# Patient Record
Sex: FEMALE | Race: WHITE | Employment: FULL TIME | URBAN - METROPOLITAN AREA
[De-identification: names, ages, dates, MRNs, and addresses within clinical notes are randomized per-mention and may not be internally consistent; named-entity substitution may affect disease eponyms.]

---

## 2017-01-09 DIAGNOSIS — Z12.31 ENCOUNTER FOR SCREENING MAMMOGRAM FOR BREAST CANCER: Primary | ICD-10-CM

## 2017-01-18 ENCOUNTER — OFFICE VISIT (OUTPATIENT)
Dept: OBGYN | Age: 47
End: 2017-01-18

## 2017-01-18 VITALS
DIASTOLIC BLOOD PRESSURE: 70 MMHG | HEIGHT: 62 IN | BODY MASS INDEX: 25.03 KG/M2 | SYSTOLIC BLOOD PRESSURE: 128 MMHG | WEIGHT: 136 LBS

## 2017-01-18 DIAGNOSIS — Z12.39 BREAST CANCER SCREENING: ICD-10-CM

## 2017-01-18 DIAGNOSIS — Z01.419 NORMAL GYNECOLOGIC EXAMINATION: Primary | ICD-10-CM

## 2017-01-18 PROCEDURE — 99386 PREV VISIT NEW AGE 40-64: CPT | Performed by: OBSTETRICS & GYNECOLOGY

## 2017-01-19 LAB — PAP SMEAR: ABNORMAL

## 2017-01-26 ENCOUNTER — TELEPHONE (OUTPATIENT)
Dept: OBGYN | Age: 47
End: 2017-01-26

## 2017-01-26 DIAGNOSIS — Z01.419 NORMAL GYNECOLOGIC EXAMINATION: ICD-10-CM

## 2017-02-17 ENCOUNTER — TELEPHONE (OUTPATIENT)
Dept: OBGYN | Age: 47
End: 2017-02-17

## 2017-03-30 ENCOUNTER — PROCEDURE VISIT (OUTPATIENT)
Dept: OBGYN | Age: 47
End: 2017-03-30

## 2017-03-30 VITALS
BODY MASS INDEX: 25.21 KG/M2 | SYSTOLIC BLOOD PRESSURE: 130 MMHG | HEART RATE: 76 BPM | HEIGHT: 62 IN | DIASTOLIC BLOOD PRESSURE: 82 MMHG | WEIGHT: 137 LBS

## 2017-03-30 DIAGNOSIS — B97.7 HPV IN FEMALE: Primary | ICD-10-CM

## 2017-03-30 LAB
CONTROL: NORMAL
PREGNANCY TEST URINE, POC: NORMAL

## 2017-03-30 PROCEDURE — 57454 BX/CURETT OF CERVIX W/SCOPE: CPT | Performed by: OBSTETRICS & GYNECOLOGY

## 2017-03-30 PROCEDURE — 81025 URINE PREGNANCY TEST: CPT | Performed by: OBSTETRICS & GYNECOLOGY

## 2017-03-30 RX ORDER — FLUCONAZOLE 200 MG/1
TABLET ORAL
Qty: 2 TABLET | Refills: 2 | Status: SHIPPED | OUTPATIENT
Start: 2017-03-30 | End: 2017-04-06

## 2017-04-10 DIAGNOSIS — B97.7 HPV IN FEMALE: ICD-10-CM

## 2017-04-12 RX ORDER — DROSPIRENONE, ETHINYL ESTRADIOL AND LEVOMEFOLATE CALCIUM AND LEVOMEFOLATE CALCIUM 3-0.02(24)
1 KIT ORAL DAILY
Qty: 28 TABLET | Refills: 2 | Status: ON HOLD | OUTPATIENT
Start: 2017-04-12 | End: 2017-05-23 | Stop reason: HOSPADM

## 2017-05-17 ENCOUNTER — PREP FOR PROCEDURE (OUTPATIENT)
Dept: OBGYN | Age: 47
End: 2017-05-17

## 2017-05-17 ENCOUNTER — OFFICE VISIT (OUTPATIENT)
Dept: OBGYN | Age: 47
End: 2017-05-17

## 2017-05-17 VITALS
DIASTOLIC BLOOD PRESSURE: 82 MMHG | HEIGHT: 62 IN | BODY MASS INDEX: 24.48 KG/M2 | SYSTOLIC BLOOD PRESSURE: 126 MMHG | WEIGHT: 133 LBS

## 2017-05-17 DIAGNOSIS — N92.1 MENOMETRORRHAGIA: ICD-10-CM

## 2017-05-17 DIAGNOSIS — N92.1 MENOMETRORRHAGIA: Primary | ICD-10-CM

## 2017-05-17 DIAGNOSIS — Z12.39 BREAST CANCER SCREENING: ICD-10-CM

## 2017-05-17 PROCEDURE — 99214 OFFICE O/P EST MOD 30 MIN: CPT | Performed by: OBSTETRICS & GYNECOLOGY

## 2017-05-17 PROCEDURE — 58100 BIOPSY OF UTERUS LINING: CPT | Performed by: OBSTETRICS & GYNECOLOGY

## 2017-05-17 RX ORDER — SODIUM CHLORIDE 0.9 % (FLUSH) 0.9 %
10 SYRINGE (ML) INJECTION EVERY 12 HOURS SCHEDULED
Status: CANCELLED | OUTPATIENT
Start: 2017-05-17

## 2017-05-17 RX ORDER — SODIUM CHLORIDE 0.9 % (FLUSH) 0.9 %
10 SYRINGE (ML) INJECTION PRN
Status: CANCELLED | OUTPATIENT
Start: 2017-05-17

## 2017-05-17 RX ORDER — SODIUM CHLORIDE, SODIUM LACTATE, POTASSIUM CHLORIDE, CALCIUM CHLORIDE 600; 310; 30; 20 MG/100ML; MG/100ML; MG/100ML; MG/100ML
INJECTION, SOLUTION INTRAVENOUS CONTINUOUS
Status: CANCELLED | OUTPATIENT
Start: 2017-05-17

## 2017-05-23 ENCOUNTER — ANESTHESIA EVENT (OUTPATIENT)
Dept: OPERATING ROOM | Age: 47
End: 2017-05-23
Payer: COMMERCIAL

## 2017-05-23 ENCOUNTER — HOSPITAL ENCOUNTER (OUTPATIENT)
Age: 47
Discharge: HOME | End: 2017-05-23
Attending: OBSTETRICS & GYNECOLOGY | Admitting: OBSTETRICS & GYNECOLOGY
Payer: COMMERCIAL

## 2017-05-23 ENCOUNTER — ANESTHESIA (OUTPATIENT)
Dept: OPERATING ROOM | Age: 47
End: 2017-05-23
Payer: COMMERCIAL

## 2017-05-23 VITALS — OXYGEN SATURATION: 100 % | DIASTOLIC BLOOD PRESSURE: 77 MMHG | TEMPERATURE: 96.8 F | SYSTOLIC BLOOD PRESSURE: 128 MMHG

## 2017-05-23 VITALS
OXYGEN SATURATION: 100 % | RESPIRATION RATE: 16 BRPM | DIASTOLIC BLOOD PRESSURE: 104 MMHG | BODY MASS INDEX: 23.92 KG/M2 | HEIGHT: 62 IN | WEIGHT: 130 LBS | HEART RATE: 71 BPM | TEMPERATURE: 97.8 F | SYSTOLIC BLOOD PRESSURE: 183 MMHG

## 2017-05-23 DIAGNOSIS — N92.1 MENOMETRORRHAGIA: ICD-10-CM

## 2017-05-23 LAB
ABO/RH: NORMAL
ANISOCYTOSIS: ABNORMAL
ANTIBODY SCREEN: NORMAL
BASOPHILS ABSOLUTE: 0.1 K/UL (ref 0–0.2)
BASOPHILS RELATIVE PERCENT: 1 %
CONTROL: NORMAL
EOSINOPHILS ABSOLUTE: 0.1 K/UL (ref 0–0.7)
EOSINOPHILS RELATIVE PERCENT: 2 %
HCT VFR BLD CALC: 40.2 % (ref 37–47)
HEMOGLOBIN: 13.4 G/DL (ref 12–16)
HYPOCHROMIA: 0
LYMPHOCYTES ABSOLUTE: 3 K/UL (ref 1–4.8)
LYMPHOCYTES RELATIVE PERCENT: 45 %
MACROCYTES: 0
MCH RBC QN AUTO: 27.2 PG (ref 27–31.3)
MCHC RBC AUTO-ENTMCNC: 33.4 % (ref 33–37)
MCV RBC AUTO: 81.3 FL (ref 82–100)
MICROCYTES: 0
MONOCYTES ABSOLUTE: 0.9 K/UL (ref 0.2–0.8)
MONOCYTES RELATIVE PERCENT: 12.5 %
NEUTROPHILS ABSOLUTE: 2.6 K/UL (ref 1.4–6.5)
NEUTROPHILS RELATIVE PERCENT: 39 %
PDW BLD-RTO: 17.3 % (ref 11.5–14.5)
PLATELET # BLD: 313 K/UL (ref 130–400)
PLATELET SLIDE REVIEW: ADEQUATE
POIKILOCYTES: 0
POLYCHROMASIA: 0
PREGNANCY TEST URINE, POC: NORMAL
RBC # BLD: 4.95 M/UL (ref 4.2–5.4)
SMUDGE CELLS: 2.9
WBC # BLD: 6.6 K/UL (ref 4.8–10.8)

## 2017-05-23 PROCEDURE — 86850 RBC ANTIBODY SCREEN: CPT

## 2017-05-23 PROCEDURE — 2580000003 HC RX 258: Performed by: NURSE ANESTHETIST, CERTIFIED REGISTERED

## 2017-05-23 PROCEDURE — 6360000002 HC RX W HCPCS: Performed by: NURSE ANESTHETIST, CERTIFIED REGISTERED

## 2017-05-23 PROCEDURE — 2500000003 HC RX 250 WO HCPCS

## 2017-05-23 PROCEDURE — 3600000014 HC SURGERY LEVEL 4 ADDTL 15MIN: Performed by: OBSTETRICS & GYNECOLOGY

## 2017-05-23 PROCEDURE — 2500000003 HC RX 250 WO HCPCS: Performed by: NURSE ANESTHETIST, CERTIFIED REGISTERED

## 2017-05-23 PROCEDURE — 7100000011 HC PHASE II RECOVERY - ADDTL 15 MIN: Performed by: OBSTETRICS & GYNECOLOGY

## 2017-05-23 PROCEDURE — 86900 BLOOD TYPING SEROLOGIC ABO: CPT

## 2017-05-23 PROCEDURE — 7100000000 HC PACU RECOVERY - FIRST 15 MIN: Performed by: OBSTETRICS & GYNECOLOGY

## 2017-05-23 PROCEDURE — 2580000003 HC RX 258: Performed by: OBSTETRICS & GYNECOLOGY

## 2017-05-23 PROCEDURE — 58563 HYSTEROSCOPY ABLATION: CPT | Performed by: OBSTETRICS & GYNECOLOGY

## 2017-05-23 PROCEDURE — 7100000001 HC PACU RECOVERY - ADDTL 15 MIN: Performed by: OBSTETRICS & GYNECOLOGY

## 2017-05-23 PROCEDURE — 85025 COMPLETE CBC W/AUTO DIFF WBC: CPT

## 2017-05-23 PROCEDURE — 2500000003 HC RX 250 WO HCPCS: Performed by: OBSTETRICS & GYNECOLOGY

## 2017-05-23 PROCEDURE — 3600000004 HC SURGERY LEVEL 4 BASE: Performed by: OBSTETRICS & GYNECOLOGY

## 2017-05-23 PROCEDURE — 88305 TISSUE EXAM BY PATHOLOGIST: CPT

## 2017-05-23 PROCEDURE — 2500000003 HC RX 250 WO HCPCS: Performed by: ANESTHESIOLOGY

## 2017-05-23 PROCEDURE — 7100000010 HC PHASE II RECOVERY - FIRST 15 MIN: Performed by: OBSTETRICS & GYNECOLOGY

## 2017-05-23 PROCEDURE — 3700000001 HC ADD 15 MINUTES (ANESTHESIA): Performed by: OBSTETRICS & GYNECOLOGY

## 2017-05-23 PROCEDURE — 86901 BLOOD TYPING SEROLOGIC RH(D): CPT

## 2017-05-23 PROCEDURE — 3700000000 HC ANESTHESIA ATTENDED CARE: Performed by: OBSTETRICS & GYNECOLOGY

## 2017-05-23 RX ORDER — DIPHENHYDRAMINE HYDROCHLORIDE 50 MG/ML
12.5 INJECTION INTRAMUSCULAR; INTRAVENOUS
Status: DISCONTINUED | OUTPATIENT
Start: 2017-05-23 | End: 2017-05-23 | Stop reason: HOSPADM

## 2017-05-23 RX ORDER — DEXAMETHASONE SODIUM PHOSPHATE 10 MG/ML
INJECTION INTRAMUSCULAR; INTRAVENOUS PRN
Status: DISCONTINUED | OUTPATIENT
Start: 2017-05-23 | End: 2017-05-23 | Stop reason: SDUPTHER

## 2017-05-23 RX ORDER — MEPERIDINE HYDROCHLORIDE 25 MG/ML
12.5 INJECTION INTRAMUSCULAR; INTRAVENOUS; SUBCUTANEOUS EVERY 5 MIN PRN
Status: DISCONTINUED | OUTPATIENT
Start: 2017-05-23 | End: 2017-05-23 | Stop reason: HOSPADM

## 2017-05-23 RX ORDER — SODIUM CHLORIDE, SODIUM LACTATE, POTASSIUM CHLORIDE, CALCIUM CHLORIDE 600; 310; 30; 20 MG/100ML; MG/100ML; MG/100ML; MG/100ML
INJECTION, SOLUTION INTRAVENOUS CONTINUOUS PRN
Status: DISCONTINUED | OUTPATIENT
Start: 2017-05-23 | End: 2017-05-23 | Stop reason: SDUPTHER

## 2017-05-23 RX ORDER — ONDANSETRON 2 MG/ML
4 INJECTION INTRAMUSCULAR; INTRAVENOUS
Status: DISCONTINUED | OUTPATIENT
Start: 2017-05-23 | End: 2017-05-23 | Stop reason: HOSPADM

## 2017-05-23 RX ORDER — LIDOCAINE HYDROCHLORIDE 10 MG/ML
1 INJECTION, SOLUTION EPIDURAL; INFILTRATION; INTRACAUDAL; PERINEURAL ONCE
Status: COMPLETED | OUTPATIENT
Start: 2017-05-23 | End: 2017-05-23

## 2017-05-23 RX ORDER — FENTANYL CITRATE 50 UG/ML
INJECTION, SOLUTION INTRAMUSCULAR; INTRAVENOUS PRN
Status: DISCONTINUED | OUTPATIENT
Start: 2017-05-23 | End: 2017-05-23 | Stop reason: SDUPTHER

## 2017-05-23 RX ORDER — OXYCODONE HYDROCHLORIDE AND ACETAMINOPHEN 5; 325 MG/1; MG/1
1 TABLET ORAL EVERY 4 HOURS PRN
Status: DISCONTINUED | OUTPATIENT
Start: 2017-05-23 | End: 2017-05-23 | Stop reason: HOSPADM

## 2017-05-23 RX ORDER — OXYCODONE HYDROCHLORIDE AND ACETAMINOPHEN 5; 325 MG/1; MG/1
2 TABLET ORAL EVERY 4 HOURS PRN
Status: DISCONTINUED | OUTPATIENT
Start: 2017-05-23 | End: 2017-05-23 | Stop reason: HOSPADM

## 2017-05-23 RX ORDER — KETOROLAC TROMETHAMINE 30 MG/ML
INJECTION, SOLUTION INTRAMUSCULAR; INTRAVENOUS PRN
Status: DISCONTINUED | OUTPATIENT
Start: 2017-05-23 | End: 2017-05-23 | Stop reason: SDUPTHER

## 2017-05-23 RX ORDER — ONDANSETRON 2 MG/ML
4 INJECTION INTRAMUSCULAR; INTRAVENOUS EVERY 8 HOURS PRN
Status: DISCONTINUED | OUTPATIENT
Start: 2017-05-23 | End: 2017-05-23 | Stop reason: HOSPADM

## 2017-05-23 RX ORDER — SODIUM CHLORIDE, SODIUM LACTATE, POTASSIUM CHLORIDE, CALCIUM CHLORIDE 600; 310; 30; 20 MG/100ML; MG/100ML; MG/100ML; MG/100ML
INJECTION, SOLUTION INTRAVENOUS
Status: DISCONTINUED
Start: 2017-05-23 | End: 2017-05-23 | Stop reason: HOSPADM

## 2017-05-23 RX ORDER — HYDROCODONE BITARTRATE AND ACETAMINOPHEN 5; 325 MG/1; MG/1
1 TABLET ORAL PRN
Status: DISCONTINUED | OUTPATIENT
Start: 2017-05-23 | End: 2017-05-23 | Stop reason: HOSPADM

## 2017-05-23 RX ORDER — GLYCOPYRROLATE 0.2 MG/ML
INJECTION INTRAMUSCULAR; INTRAVENOUS PRN
Status: DISCONTINUED | OUTPATIENT
Start: 2017-05-23 | End: 2017-05-23 | Stop reason: SDUPTHER

## 2017-05-23 RX ORDER — SODIUM CHLORIDE, SODIUM LACTATE, POTASSIUM CHLORIDE, CALCIUM CHLORIDE 600; 310; 30; 20 MG/100ML; MG/100ML; MG/100ML; MG/100ML
INJECTION, SOLUTION INTRAVENOUS CONTINUOUS
Status: DISCONTINUED | OUTPATIENT
Start: 2017-05-23 | End: 2017-05-23 | Stop reason: HOSPADM

## 2017-05-23 RX ORDER — METOCLOPRAMIDE HYDROCHLORIDE 5 MG/ML
10 INJECTION INTRAMUSCULAR; INTRAVENOUS
Status: DISCONTINUED | OUTPATIENT
Start: 2017-05-23 | End: 2017-05-23 | Stop reason: HOSPADM

## 2017-05-23 RX ORDER — HYDROCODONE BITARTRATE AND ACETAMINOPHEN 5; 325 MG/1; MG/1
2 TABLET ORAL PRN
Status: DISCONTINUED | OUTPATIENT
Start: 2017-05-23 | End: 2017-05-23 | Stop reason: HOSPADM

## 2017-05-23 RX ORDER — PROPOFOL 10 MG/ML
INJECTION, EMULSION INTRAVENOUS PRN
Status: DISCONTINUED | OUTPATIENT
Start: 2017-05-23 | End: 2017-05-23 | Stop reason: SDUPTHER

## 2017-05-23 RX ORDER — SODIUM CHLORIDE 0.9 % (FLUSH) 0.9 %
10 SYRINGE (ML) INJECTION EVERY 12 HOURS SCHEDULED
Status: DISCONTINUED | OUTPATIENT
Start: 2017-05-23 | End: 2017-05-23 | Stop reason: HOSPADM

## 2017-05-23 RX ORDER — OXYCODONE HYDROCHLORIDE AND ACETAMINOPHEN 5; 325 MG/1; MG/1
1 TABLET ORAL EVERY 4 HOURS PRN
Qty: 30 TABLET | Refills: 0 | Status: SHIPPED | OUTPATIENT
Start: 2017-05-23 | End: 2017-06-07

## 2017-05-23 RX ORDER — SODIUM CHLORIDE 0.9 % (FLUSH) 0.9 %
10 SYRINGE (ML) INJECTION PRN
Status: DISCONTINUED | OUTPATIENT
Start: 2017-05-23 | End: 2017-05-23 | Stop reason: HOSPADM

## 2017-05-23 RX ORDER — FENTANYL CITRATE 50 UG/ML
50 INJECTION, SOLUTION INTRAMUSCULAR; INTRAVENOUS EVERY 10 MIN PRN
Status: DISCONTINUED | OUTPATIENT
Start: 2017-05-23 | End: 2017-05-23 | Stop reason: HOSPADM

## 2017-05-23 RX ORDER — LABETALOL HYDROCHLORIDE 5 MG/ML
10 INJECTION, SOLUTION INTRAVENOUS ONCE
Status: DISCONTINUED | OUTPATIENT
Start: 2017-05-23 | End: 2017-05-23 | Stop reason: HOSPADM

## 2017-05-23 RX ORDER — LIDOCAINE HYDROCHLORIDE 20 MG/ML
INJECTION, SOLUTION INFILTRATION; PERINEURAL PRN
Status: DISCONTINUED | OUTPATIENT
Start: 2017-05-23 | End: 2017-05-23 | Stop reason: SDUPTHER

## 2017-05-23 RX ORDER — ONDANSETRON 2 MG/ML
INJECTION INTRAMUSCULAR; INTRAVENOUS PRN
Status: DISCONTINUED | OUTPATIENT
Start: 2017-05-23 | End: 2017-05-23 | Stop reason: SDUPTHER

## 2017-05-23 RX ORDER — IBUPROFEN 600 MG/1
600 TABLET ORAL EVERY 6 HOURS PRN
Qty: 120 TABLET | Refills: 3 | Status: SHIPPED | OUTPATIENT
Start: 2017-05-23 | End: 2017-06-07

## 2017-05-23 RX ORDER — LABETALOL HYDROCHLORIDE 5 MG/ML
INJECTION, SOLUTION INTRAVENOUS
Status: DISCONTINUED
Start: 2017-05-23 | End: 2017-05-23 | Stop reason: WASHOUT

## 2017-05-23 RX ORDER — MIDAZOLAM HYDROCHLORIDE 1 MG/ML
INJECTION INTRAMUSCULAR; INTRAVENOUS PRN
Status: DISCONTINUED | OUTPATIENT
Start: 2017-05-23 | End: 2017-05-23 | Stop reason: SDUPTHER

## 2017-05-23 RX ORDER — MAGNESIUM HYDROXIDE 1200 MG/15ML
LIQUID ORAL CONTINUOUS PRN
Status: DISCONTINUED | OUTPATIENT
Start: 2017-05-23 | End: 2017-05-23 | Stop reason: HOSPADM

## 2017-05-23 RX ADMIN — DOXYCYCLINE 100 G: 100 INJECTION, POWDER, LYOPHILIZED, FOR SOLUTION INTRAVENOUS at 08:23

## 2017-05-23 RX ADMIN — MIDAZOLAM HYDROCHLORIDE 2 MG: 1 INJECTION, SOLUTION INTRAMUSCULAR; INTRAVENOUS at 08:20

## 2017-05-23 RX ADMIN — DEXAMETHASONE SODIUM PHOSPHATE 6 MG: 10 INJECTION INTRAMUSCULAR; INTRAVENOUS at 08:24

## 2017-05-23 RX ADMIN — PROPOFOL 120 MG: 10 INJECTION, EMULSION INTRAVENOUS at 08:24

## 2017-05-23 RX ADMIN — LIDOCAINE HYDROCHLORIDE 1 ML: 10 INJECTION, SOLUTION EPIDURAL; INFILTRATION; INTRACAUDAL; PERINEURAL at 07:09

## 2017-05-23 RX ADMIN — ONDANSETRON 4 MG: 2 INJECTION, SOLUTION INTRAMUSCULAR; INTRAVENOUS at 08:47

## 2017-05-23 RX ADMIN — LABETALOL HYDROCHLORIDE 10 MG: 5 INJECTION, SOLUTION INTRAVENOUS at 09:38

## 2017-05-23 RX ADMIN — KETOROLAC TROMETHAMINE 15 MG: 30 INJECTION, SOLUTION INTRAMUSCULAR; INTRAVENOUS at 08:47

## 2017-05-23 RX ADMIN — GLYCOPYRROLATE 0.2 MG: 0.2 INJECTION INTRAMUSCULAR; INTRAVENOUS at 08:35

## 2017-05-23 RX ADMIN — LIDOCAINE HYDROCHLORIDE 70 MG: 20 INJECTION, SOLUTION INFILTRATION; PERINEURAL at 08:24

## 2017-05-23 RX ADMIN — SODIUM CHLORIDE, POTASSIUM CHLORIDE, SODIUM LACTATE AND CALCIUM CHLORIDE: 600; 310; 30; 20 INJECTION, SOLUTION INTRAVENOUS at 07:54

## 2017-05-23 RX ADMIN — FENTANYL CITRATE 50 MCG: 50 INJECTION, SOLUTION INTRAMUSCULAR; INTRAVENOUS at 08:24

## 2017-05-23 RX ADMIN — SODIUM CHLORIDE, POTASSIUM CHLORIDE, SODIUM LACTATE AND CALCIUM CHLORIDE 1000 ML: 600; 310; 30; 20 INJECTION, SOLUTION INTRAVENOUS at 07:09

## 2017-05-23 ASSESSMENT — PAIN - FUNCTIONAL ASSESSMENT: PAIN_FUNCTIONAL_ASSESSMENT: 0-10

## 2017-05-23 ASSESSMENT — PAIN SCALES - GENERAL: PAINLEVEL_OUTOF10: 0

## 2017-06-07 ENCOUNTER — OFFICE VISIT (OUTPATIENT)
Dept: OBGYN | Age: 47
End: 2017-06-07

## 2017-06-07 VITALS
HEIGHT: 62 IN | BODY MASS INDEX: 24.66 KG/M2 | WEIGHT: 134 LBS | SYSTOLIC BLOOD PRESSURE: 124 MMHG | DIASTOLIC BLOOD PRESSURE: 86 MMHG

## 2017-06-07 DIAGNOSIS — N92.1 MENOMETRORRHAGIA: Primary | ICD-10-CM

## 2017-06-07 PROCEDURE — 99213 OFFICE O/P EST LOW 20 MIN: CPT | Performed by: OBSTETRICS & GYNECOLOGY

## 2017-09-29 ENCOUNTER — TELEPHONE (OUTPATIENT)
Dept: OBGYN | Age: 47
End: 2017-09-29

## 2021-12-10 ENCOUNTER — HOSPITAL ENCOUNTER (OUTPATIENT)
Dept: WOMENS IMAGING | Age: 51
Discharge: HOME | End: 2021-12-12
Payer: COMMERCIAL

## 2021-12-10 VITALS — BODY MASS INDEX: 24.51 KG/M2 | HEIGHT: 62 IN

## 2021-12-10 DIAGNOSIS — Z12.31 SCREENING MAMMOGRAM, ENCOUNTER FOR: ICD-10-CM

## 2021-12-10 PROCEDURE — 77063 BREAST TOMOSYNTHESIS BI: CPT

## 2022-02-18 ENCOUNTER — HOSPITAL ENCOUNTER (OUTPATIENT)
Dept: PHYSICAL THERAPY | Age: 52
Setting detail: THERAPIES SERIES
Discharge: HOME | End: 2022-02-18
Payer: COMMERCIAL

## 2022-02-18 PROCEDURE — 97161 PT EVAL LOW COMPLEX 20 MIN: CPT

## 2022-02-18 ASSESSMENT — PAIN DESCRIPTION - PAIN TYPE: TYPE: CHRONIC PAIN

## 2022-02-18 ASSESSMENT — PAIN DESCRIPTION - LOCATION: LOCATION: KNEE

## 2022-02-18 ASSESSMENT — PAIN SCALES - GENERAL: PAINLEVEL_OUTOF10: 8

## 2022-02-18 ASSESSMENT — PAIN - FUNCTIONAL ASSESSMENT: PAIN_FUNCTIONAL_ASSESSMENT: PREVENTS OR INTERFERES WITH ALL ACTIVE AND SOME PASSIVE ACTIVITIES

## 2022-02-18 ASSESSMENT — PAIN DESCRIPTION - FREQUENCY: FREQUENCY: CONTINUOUS

## 2022-02-18 ASSESSMENT — PAIN DESCRIPTION - ORIENTATION: ORIENTATION: RIGHT;ANTERIOR

## 2022-02-18 ASSESSMENT — PAIN DESCRIPTION - ONSET: ONSET: ON-GOING

## 2022-02-18 ASSESSMENT — PAIN DESCRIPTION - DESCRIPTORS: DESCRIPTORS: TIGHTNESS

## 2022-02-18 ASSESSMENT — PAIN DESCRIPTION - PROGRESSION: CLINICAL_PROGRESSION: GRADUALLY WORSENING

## 2022-02-18 NOTE — PROGRESS NOTES
[address]  PHYSICAL THERAPY PLAN OF CARE   [address]enia [address], [address] [facility]         Ph: [phone]  Fax: [phone]    [] Certification  [] Recertification [x]  Plan of Care  [] Progress Note [] Discharge      To:   Beatriz Danielle      From:  Jeremi Kenny, PT, DPT   Patient: Dawit Virk     : 1970  Diagnosis: Patellofemoral pain syndrome of the R knee     Date: 2022  Treatment Diagnosis: R knee pain, decreased B tibial IR, R Hip ER, R HS flexibility, B gastroc flexibility, R soleus flexibility, decreased R LE strength, decreased R SLS stability, decreased functional squat, and impaired functional activity tolerance     Progress Report Period from:  2022  to 2022    Total # of Visits to Date: 1   No Show: 0    Canceled Appointment: 0     OBJECTIVE:   Short Term Goals - Time Frame for Short term goals: 2-3 weeks    Goals Current/Discharge status  Met   Short term goal 1: The pt will report decreased R knee pain >/=75% in order to increase ease with sleeping and sitting prolonged periods    Pain Location: Knee    Pain Level: 8 (Pain ranges from 3-10/10)    Pain Descriptors: Tightness [] yes  [x] no     Long Term Goals - Time Frame for Long term goals : 4-5 weeks  Goals Current/ Discharge status Met   Long term goal 1: The pt will have a increase in LEFS score >/=75/80 points in order to increase functional activity tolerance Exam: high; LEFS 63/80   [] yes  [x] no   Long term goal 2: The pt will demo improved R LE strength 4+ to 5/5 and R SL HR >15 reps without instability in order to ambulate prolonged periods without repports of R knee pain Strength RLE  Comment: 4/5 hip flex, IR, ER, knee flex, ext 5/5 ankle DF  Strength LLE  Comment: 5/5 knee, ankle DF, abd 4+/5 hip flex, ER, IR, ext     Strength Other  Other: instability noted with single leg HR R x12 reps; L HR WNL's [] yes  [x] no   Long term goal 3: The pt will demo improved R hip ER >/=40*, B tibial IR >/=20*, R 90/90 HS </=20*, B ankle DF with KE >/=15* in order to increase LE biomechanics PROM RLE (degrees)  RLE General PROM: 90/90 HS 26  AROM RLE (degrees)  RLE General AROM: Hip ER 30, IR 55; limited Tibial IR; ankle DF with KE 8, with KF 14; knee 0-135  AROM LLE (degrees)  LLE General AROM:  limited tibial IR [] yes  [x] no   Long term goal 4: The pt will be indep/compliant with HEP in order to self-manage symptoms upon D/C ongoing [] yes  [x] no   Long term goal 5: The pt will demo improved R SLS >/=10 seconds to increase R LE stability Balance  Single Leg Stance R Le  Single Leg Stance L Leg: 10  Comments: mild frontal plane instability L SLS, signifciant frontal plane instability and LOB SLS R [] yes  [x] no      Body structures, Functions, Activity limitations: Decreased functional mobility ,Decreased ROM,Decreased strength,Decreased posture,Increased pain,Decreased balance,Decreased high-level IADLs  Assessment: Pt presents with onset of R knee pain >/=6 weeks ago with progressive worsening with PMH of R calf strain >/=4 months ago. She currently presents with decreased B tibial IR, R Hip ER, R HS flexibility, B gastroc flexibility, R soleus flexibility, decreased LE biomechanics, decreased R LE strength, decreased R SLS stability, decreased functional squat, and impaired functional activity tolerance. These impairments currently limit her functional abilities to ambulate prolonged periods, sit prolonged periods, perofrm transfers after sitting prolonged periods, or sleep without increased pain or limitations at PLOF.   Prognosis: Excellent  Discharge Recommendations: Continue to assess pending progress    PT Education: Goals;PT Role;Plan of Care;Home Exercise Program  Patient Education: Insurance authorization    PLAN: [x] Evaluate and Treat  Frequency/Duration:  Plan  Times per week: 2  Plan weeks: 4-5  Current Treatment Recommendations: Strengthening,ROM,Balance Training,Manual Therapy - Soft Tissue Mobilization,Patient/Caregiver Education & Training,Neuromuscular Re-education,Home Exercise Program,Integrated Dry Needling,Positioning,Modalities,Manual Therapy - Joint Manipulation     Precautions: PMH CVA                          Patient Status:[x] Continue/ Initiate plan of Care    [] Discharge PT. Recommend pt continue with HEP. [] Additional visits requested, Please re-certify for additional visits:        Signature: Electronically signed by Jag Sanchez PT on 2/18/22 at 2:54 PM EST    If you have any questions or concerns, please don't hesitate to call. Thank you for your referral.    I have reviewed this plan of care and certify a need for medically necessary rehabilitation services.     Physician Signature:__________________________________________________________  Date:  Please sign and return

## 2022-02-18 NOTE — PROGRESS NOTES
[facility]  PHYSICAL THERAPY EVALUATION    Date: 2022  Patient Name: Benjie Kelsey       MRN: [id number]   Account: [de-identified]   : 1970  (54 y.o.)   Gender: female   Referring Practitioner: Fani Birmingham                 Diagnosis: Patellofemoral pain syndrome of the R knee  Treatment Diagnosis: R knee pain, decreased B tibial IR, R Hip ER, R HS flexibility, B gastroc flexibility, R soleus flexibility, decreased R LE strength, decreased R SLS stability, decreased functional squat, and impaired functional activity tolerance     Past Medical History:  has a past medical history of Goiter, HPV in female, and Hypertension. Past Surgical History:   has a past surgical history that includes cyst removal; Tubal ligation; and Dilation and curettage of uterus (N/A, 2017). Vital Signs  Patient Currently in Pain: Yes   Pain Screening  Patient Currently in Pain: Yes  Pain Assessment  Pain Assessment: 0-10  Pain Level: 8 (Pain ranges from 3-10/10)  Pain Type: Chronic pain  Pain Location: Knee  Pain Orientation: Right; Anterior  Pain Descriptors: Tightness  Pain Frequency: Continuous  Pain Onset: On-going  Clinical Progression: Gradually worsening  Functional Pain Assessment: Prevents or interferes with all active and some passive activities  Non-Pharmaceutical Pain Intervention(s): Heat applied (sits in hot tub)     Lives With: Alone  Type of Home: House  Home Layout: One level  ADL Assistance: Independent  Homemaking Assistance: Independent  Homemaking Responsibilities: Yes  Ambulation Assistance: Independent  Transfer Assistance: Independent  Active : Yes  Occupation: Full time employment  Type of occupation: - sits prolonged periods with icnreased pain and stiffness  Leisure & Hobbies: Exercise  IADL Comments: current fucntional level 75%; PLOF 100%  Additional Comments: 7-8 stairs to enter work building- able to perform reciprocally Subjective:  Subjective: Pt reports last 2021 stepped in a hold in the yard and felt a \"pop\" in R calf region, went to the ER and had therapy with relief. Was okay until a few months ago tripped over dog in 2021 and felt the same area re-injure, now pain has began to travel to the knee over the last 6 weeks. Pt denies wearing any current inserts in shoes d/t ill fitting in winter boots. Had xrays taken which showed some arthritis. Pt was prescribed anti-inflammatory though has not been taking d/t making her ill. Denies any N/T into L LE, falls, popping, clicking, or buckling.   Comments: RTD 3/15/22    Objective:   Balance  Single Leg Stance R Le  Single Leg Stance L Leg: 10  Comments: mild frontal plane instability L SLS, signifciant frontal plane instability and LOB SLS R    Strength RLE  Comment: 4/5 hip flex, IR, ER, knee flex, ext 5/5 ankle DF  Strength LLE  Comment: 5/5 knee, ankle DF, abd 4+/5 hip flex, ER, IR, ext     Strength Other  Other: instability noted with single leg HR R x12 reps; L HR WNL's    PROM RLE (degrees)  RLE General PROM: 90/90 HS 26  AROM RLE (degrees)  RLE General AROM: Hip ER 30, IR 55; limited Tibial IR; ankle DF with KE 8, with KF 14; knee 0-135  PROM LLE (degrees)  LLE General PROM: 90/90 HS 10  AROM LLE (degrees)  LLE General AROM: Hip ER 40, IR 55; limited tibial IR; ankle DF with KE 10, with KF 20; knee 0-135    Observation/Palpation  Posture: Good  Palpation: (-) tenderness to palpate med/lateral joint line, knee structures; mild tightness distal R ITB  Observation: R genuvlagus, B calcaneal varus, patella loreto    Additional Measures  Flexibility: decreased R HS flexibility, decreased B gastroc flexibility, R soleus flexibility  Special Tests: (-) Thessaleys, McMurrays, varus/valgus stress, Anterior/posterior drawer, Apleys compression  Other: able to perform mid depth squat with pain     Exercises:   Exercises  Exercise 1: gastroc stretch with strap 30\"x1- demo for HEP  Exercise 2: HS stretch long sitting 30\"x1- demo for HEP  Exercise 3: 4 way SLR*  Exercise 4: S/L hip series*  Exercise 5: bridges*  Exercise 6: 90/90 pball press/hip ext into wall*  Exercise 7: Seated Tibial IR Yifan*  Exercise 8: R piriformis stretch*  Exercise 9: SLS*  Exercise 10: step ups with focus on stability*  Exercise 11: 4 way hip*  Exercise 20: HEP: gastroc/HS stretches  *Indicates exercise,modality, or manual techniques to be initiated when appropriate    Assessment: Body structures, Functions, Activity limitations: Decreased functional mobility ,Decreased ROM,Decreased strength,Decreased posture,Increased pain,Decreased balance,Decreased high-level IADLs  Assessment: Pt presents with onset of R knee pain >/=6 weeks ago with progressive worsening with PMH of R calf strain >/=4 months ago. She currently presents with decreased B tibial IR, R Hip ER, R HS flexibility, B gastroc flexibility, R soleus flexibility, decreased LE biomechanics, decreased R LE strength, decreased R SLS stability, decreased functional squat, and impaired functional activity tolerance. These impairments currently limit her functional abilities to ambulate prolonged periods, sit prolonged periods, perofrm transfers after sitting prolonged periods, or sleep without increased pain or limitations at PLOF. Prognosis: Excellent  Discharge Recommendations: Continue to assess pending progress  Activity Tolerance: Patient Tolerated treatment well  Activity Tolerance:  Tolerated eval without incidence     Decision Making: Low Complexity  History: med: chronic pain, CVA  Exam: high; LEFS 63/80  Clinical Presentation: low      Plan  Frequency/Duration:  Plan  Times per week: 2  Plan weeks: 4-5  Current Treatment Recommendations: Strengthening,ROM,Balance Training,Manual Therapy - Soft Tissue Mobilization,Patient/Caregiver Education & Training,Neuromuscular Re-education,Home Exercise Program,Integrated Dry Needling,Positioning,Modalities,Manual Therapy - Joint Manipulation    Patient Education  New Education Provided: PT Education: Goals;PT Role;Plan of Care;Home Exercise Program  Patient Education: Insurance authorization    POST-PAIN     Pain Rating (0-10 pain scale):  3 /10  Location and pain description same as pre-treatment unless indicated. Action: [] NA  [] Call Physician  [x] Perform HEP  [] Meds as prescribed    Evaluation and patient rights have been reviewed and patient agrees with plan of care. Yes  [x]  No  []   Explain:     Kyrie Fall Risk Assessment  Risk Factor Scale  Score   History of Falls [] Yes  [x] No 25  0    Secondary Diagnosis [] Yes  [x] No 15  0    Ambulatory Aid [] Furniture  [] Crutches/cane/walker  [x] None/bedrest/wheelchair/nurse 30  15  0    IV/Heparin Lock [] Yes  [x] No 20  0    Gait/Transferring [] Impaired  [] Weak  [x] Normal/bedrest/immobile 20  10  0    Mental Status [] Forgets limitations  [x] Oriented to own ability 15  0       Total: 0     Based on the Assessment score: check the appropriate box.   [x]  No intervention needed   Low =   Score of 0-24  []  Use standard prevention interventions Moderate =  Score of 24-44   [] Discuss fall prevention strategies   [] Indicate moderate falls risk on eval  []  Use high risk prevention interventions High = Score of 45 and higher   [] Discuss fall prevention strategies   [] Provide supervision during treatment time    Goals  Short term goals  Time Frame for Short term goals: 2-3 weeks  Short term goal 1: The pt will report decreased R knee pain >/=75% in order to increase ease with sleeping and sitting prolonged periods  Long term goals  Time Frame for Long term goals : 4-5 weeks  Long term goal 1: The pt will have a increase in LEFS score >/=75/80 points in order to increase functional activity tolerance  Long term goal 2: The pt will demo improved R LE strength 4+ to 5/5 and R SL HR >15 reps without instability in order to ambulate prolonged periods without repports of R knee pain  Long term goal 3: The pt will demo improved R hip ER >/=40*, B tibial IR >/=20*, R 90/90 HS </=20*, B ankle DF with KE >/=15* in order to increase LE biomechanics  Long term goal 4: The pt will be indep/compliant with HEP in order to self-manage symptoms upon D/C  Long term goal 5:  The pt will demo improved R SLS >/=10 seconds to increase R LE stability    Treatment Initiated : HEP    PT Individual Minutes  Time In: 1350  Time Out: 1425  Minutes: 35  Timed Code Treatment Minutes: 2 Minutes  Procedure Minutes: 33 eval     Timed Activity Minutes Units   Ther Ex 2 0     Electronically signed by Antonia Pickard PT on 2/18/22 at 2:56 PM EST

## 2022-02-25 ENCOUNTER — HOSPITAL ENCOUNTER (OUTPATIENT)
Dept: PHYSICAL THERAPY | Age: 52
Setting detail: THERAPIES SERIES
Discharge: HOME | End: 2022-02-25
Payer: COMMERCIAL

## 2022-02-25 NOTE — PROGRESS NOTES
Therapy                            Cancellation/No-show Note      Date:  2022  Patient Name:  Benjie Kelsey  :  1970   MRN:  [id number]  Referring Practitioner: Fani Birmingham  Diagnosis: Patellofemoral pain syndrome of the R knee    Visit Information:  PT Visit Information  PT Insurance Information: BCBS  Total # of Visits to Date: 1  No Show: 0  Canceled Appointment: 1  Progress Note Counter: CX 22    For today's appointment patient:  [x]  Cancelled  []  Rescheduled appointment  []  No-show   []  Called pt to remind of next appointment     Reason given by patient:  []  Patient ill  []  Conflicting appointment  []  No transportation    [x]  Conflict with Babysitting   []  No reason given  []  Other:      [x] Pt has future appointments scheduled, no follow up needed  [] Pt requests to be on hold.     Reason:   If > 2 weeks please discuss with therapist.  [] Therapist to call pt for follow up     Comments:       Signature: Electronically signed by Rod Brandon PTA on 22 at 10:00 AM EST

## 2022-03-01 ENCOUNTER — HOSPITAL ENCOUNTER (OUTPATIENT)
Dept: PHYSICAL THERAPY | Age: 52
Setting detail: THERAPIES SERIES
Discharge: HOME | End: 2022-03-01
Payer: COMMERCIAL

## 2022-03-01 PROCEDURE — 97110 THERAPEUTIC EXERCISES: CPT

## 2022-03-01 ASSESSMENT — PAIN DESCRIPTION - PAIN TYPE: TYPE: CHRONIC PAIN

## 2022-03-01 ASSESSMENT — PAIN DESCRIPTION - LOCATION: LOCATION: KNEE

## 2022-03-01 ASSESSMENT — PAIN SCALES - GENERAL: PAINLEVEL_OUTOF10: 2

## 2022-03-01 ASSESSMENT — PAIN DESCRIPTION - ORIENTATION: ORIENTATION: RIGHT;ANTERIOR

## 2022-03-01 ASSESSMENT — PAIN DESCRIPTION - DESCRIPTORS: DESCRIPTORS: ACHING

## 2022-03-01 NOTE — PROGRESS NOTES
[address] [facility]  Outpatient Physical Therapy    Treatment Note        Date: 3/1/2022  Patient: Anette Damon  : 1970  ACCT #: [de-identified]  Referring Practitioner: Hollis Guillermo  Diagnosis: Patellofemoral pain syndrome of the R knee  Treatment Diagnosis: R knee pain, decreased B tibial IR, R Hip ER, R HS flexibility, B gastroc flexibility, R soleus flexibility, decreased R LE strength, decreased R SLS stability, decreased functional squat, and impaired functional activity tolerance     Visit Information:  PT Visit Information  PT Insurance Information: BCBS  Total # of Visits Approved: 5  Total # of Visits to Date: 1  No Show: 0  Progress Note Due Date: 22  Canceled Appointment: 1  Progress Note Counter:  (through 22)    Subjective: Pt reports she has had minimal pain lately, has been compliant with HEP and has also been tanning and feels that the heat from the tanning bed might be helping with her pain. HEP Compliance:  [x] Good [] Fair [] Poor [] Reports not doing due to:    Vital Signs  Patient Currently in Pain: Yes   Pain Screening  Patient Currently in Pain: Yes  Pain Assessment  Pain Assessment: 0-10  Pain Level: 2  Pain Type: Chronic pain  Pain Location: Knee  Pain Orientation: Right; Anterior  Pain Descriptors: Aching    OBJECTIVE:   Exercises  Exercise 1: gastroc stretch with strap 30\"x3  Exercise 2: HS stretch long sitting and standing at tower 30\"x3 ea. Exercise 3: 4 way SLR x15 ea.   Exercise 4: S/L hip series x10 circles  Exercise 5: bridges 5\"x15  Exercise 6: 90/90 single leg pball press/hip ext into wall 5\"x10  Exercise 7: Seated Tibial IR Yifan 2x15- VC/TC's for decreased hip compensations  Exercise 8: R piriformis stretch 30\"x3  Exercise 9: SLS 10\"x5- D/C NV  Exercise 10: step ups 6\" with focus on stability/opposite LE march and hold 5\"x15 F/L  Exercise 11: 4 way hip RTB x10 Yifan  Exercise 12: R SLS w reach x10  Exercise 13: eccentric HR (up with Yifan down on R only) 5\"x10  Exercise 14: Fitter B gastroc/soleus stretch 30\"x3 (2nd position)  Exercise 15: glute dominant squats x15  Exercise 20: HEP: 4 way SLR, bridges, pirifromis stretch    Strength: [x] NT  [] MMT completed:    ROM: [x] NT  [] ROM measurements:    *Indicates exercise, modality, or manual techniques to be initiated when appropriate    Assessment: Body structures, Functions, Activity limitations: Decreased functional mobility ,Decreased ROM,Decreased strength,Decreased posture,Increased pain,Decreased balance,Decreased high-level IADLs  Assessment: Initiated multiple exercises for increased functional R LE strength and stability as well as stretches to improve LE biomechanics. Pt with good tolerance to tx without reports of pain though some increased fatigue noted throughout. Pt also challenged with dynamic single leg stability activities though met static SLS goal this date.   Treatment Diagnosis: R knee pain, decreased B tibial IR, R Hip ER, R HS flexibility, B gastroc flexibility, R soleus flexibility, decreased R LE strength, decreased R SLS stability, decreased functional squat, and impaired functional activity tolerance  Prognosis: Excellent     Goals:  Short term goals  Time Frame for Short term goals: 2-3 weeks  Short term goal 1: The pt will report decreased R knee pain >/=75% in order to increase ease with sleeping and sitting prolonged periods    Long term goals  Time Frame for Long term goals : 4-5 weeks  Long term goal 1: The pt will have a increase in LEFS score >/=75/80 points in order to increase functional activity tolerance  Long term goal 2: The pt will demo improved R LE strength 4+ to 5/5 and R SL HR >15 reps without instability in order to ambulate prolonged periods without repports of R knee pain  Long term goal 3: The pt will demo improved R hip ER >/=40*, B tibial IR >/=20*, R 90/90 HS </=20*, B ankle DF with KE >/=15* in order to increase LE biomechanics  Long term goal 4: The pt will be indep/compliant with HEP in order to self-manage symptoms upon D/C  Long term goal 5: The pt will demo improved R SLS >/=10 seconds to increase R LE stability (met 3/1/22)  Progress toward goals: to be determined, met LTG 5     POST-PAIN       Pain Rating (0-10 pain scale):  0 /10   Location and pain description same as pre-treatment unless indicated. Action: [] NA   [x] Perform HEP  [] Meds as prescribed  [] Modalities as prescribed   [] Call Physician     Frequency/Duration:  Plan  Times per week: 2  Plan weeks: 4-5  Current Treatment Recommendations: Strengthening,ROM,Balance Training,Manual Therapy - Soft Tissue Mobilization,Patient/Caregiver Education & Training,Neuromuscular Re-education,Home Exercise Program,Integrated Dry Needling,Positioning,Modalities,Manual Therapy - Joint Manipulation     Pt to continue current HEP. See objective section for any therapeutic exercise changes, additions or modifications this date.     PT Individual Minutes  Time In: 3918  Time Out: 4575  Minutes: 38  Timed Code Treatment Minutes: 38 Minutes  Procedure Minutes: 0    Timed Activity Minutes Units   Ther Ex 38 3     Signature:  Electronically signed by Ezequiel Mejia, PT on 3/1/22 at 4:15 PM EST

## 2022-03-08 ENCOUNTER — HOSPITAL ENCOUNTER (OUTPATIENT)
Dept: PHYSICAL THERAPY | Age: 52
Setting detail: THERAPIES SERIES
Discharge: HOME | End: 2022-03-08
Payer: COMMERCIAL

## 2022-03-08 PROCEDURE — 97110 THERAPEUTIC EXERCISES: CPT

## 2022-03-08 NOTE — PROGRESS NOTES
[facility] [address] [facility]  Outpatient Physical Therapy    Treatment Note        Date: 3/8/2022  Patient: Dorothy Street  : 1970  ACCT #: [de-identified]  Referring Practitioner: Phil Mcclure  Diagnosis: Patellofemoral pain syndrome of the R knee  Treatment Diagnosis: R knee pain, decreased B tibial IR, R Hip ER, R HS flexibility, B gastroc flexibility, R soleus flexibility, decreased R LE strength, decreased R SLS stability, decreased functional squat, and impaired functional activity tolerance     Visit Information:  PT Visit Information  PT Insurance Information: BCBS  Total # of Visits Approved: 5  Total # of Visits to Date: 2  No Show: 1  Canceled Appointment: 1  Progress Note Counter:  (through 22)    Subjective: Pt reports having intermittent soreness in R knee \"every once in awhile. \" Some generalized soreness following LV though subsided. HEP Compliance:  [x] Good [] Fair [] Poor [] Reports not doing due to:    Vital Signs  Patient Currently in Pain: Denies   Pain Screening  Patient Currently in Pain: Denies    OBJECTIVE:   Exercises  Exercise 1: gastroc stretch standing 30\"x3  Exercise 2: HS stretch standing at tower 30\"x3 ea. Exercise 3: 4 way SLR x15 ea. Exercise 4: S/L hip series x10 circles  Exercise 5: bridges 5\"x15  Exercise 6: Apollo leg press 20# squats and HR x15 ea.   Exercise 7: Seated Tibial IR Yifan 2x15- VC/TC's for decreased hip compensations  Exercise 8: R piriformis stretch 30\"x3  Exercise 10: step ups 8\" with focus on stability/opposite LE march and hold 5\"x15 F/L  Exercise 11: 4 way hip RTB x10 Yifan  Exercise 12: R SLS w reach 2x10  Exercise 13: eccentric HR (up with Yifan down on R only) 5\"x10  Exercise 15: glute dominant squats x15  Exercise 20: HEP: HS stretch, gastroc stretch, squats, 4 way hip    Strength: [x] NT  [] MMT completed:    ROM: [x] NT  [] ROM measurements:    *Indicates exercise, modality, or manual techniques to be initiated when appropriate    Assessment: Body structures, Functions, Activity limitations: Decreased functional mobility ,Decreased ROM,Decreased strength,Decreased posture,Increased pain,Decreased balance,Decreased high-level IADLs  Assessment: Progressed ther ex for further strength and stability of the R LE. Reviewed exercises and gym equipment for pt to complete indep upon D/C with good understanding. Pt without c/o increased pain throughout tx. Conts to be challenged with SLS balance activities though able to self correct. Treatment Diagnosis: R knee pain, decreased B tibial IR, R Hip ER, R HS flexibility, B gastroc flexibility, R soleus flexibility, decreased R LE strength, decreased R SLS stability, decreased functional squat, and impaired functional activity tolerance  Prognosis: Excellent  Patient Education: Reviewed gym equipment for use indep including leg press, hip abd/add machines, and 4 way hip at cable column    Goals:  Short term goals  Time Frame for Short term goals: 2-3 weeks  Short term goal 1: The pt will report decreased R knee pain >/=75% in order to increase ease with sleeping and sitting prolonged periods    Long term goals  Time Frame for Long term goals : 4-5 weeks  Long term goal 1: The pt will have a increase in LEFS score >/=75/80 points in order to increase functional activity tolerance  Long term goal 2: The pt will demo improved R LE strength 4+ to 5/5 and R SL HR >15 reps without instability in order to ambulate prolonged periods without repports of R knee pain  Long term goal 3: The pt will demo improved R hip ER >/=40*, B tibial IR >/=20*, R 90/90 HS </=20*, B ankle DF with KE >/=15* in order to increase LE biomechanics  Long term goal 4: The pt will be indep/compliant with HEP in order to self-manage symptoms upon D/C  Long term goal 5:  The pt will demo improved R SLS >/=10 seconds to increase R LE stability (met 3/1/22)  Progress toward goals:good towards all with tolerance with progressions     POST-PAIN       Pain Rating (0-10 pain scale):   0/10   Location and pain description same as pre-treatment unless indicated. Action: [] NA   [x] Perform HEP  [] Meds as prescribed  [] Modalities as prescribed   [] Call Physician     Frequency/Duration:  Plan  Times per week: 2  Plan weeks: 4-5  Specific instructions for Next Treatment: consider D/C  Current Treatment Recommendations: Strengthening,ROM,Balance Training,Manual Therapy - Soft Tissue Mobilization,Patient/Caregiver Education & Training,Neuromuscular Re-education,Home Exercise Program,Integrated Dry Needling,Positioning,Modalities,Manual Therapy - Joint Manipulation     Pt to continue current HEP. See objective section for any therapeutic exercise changes, additions or modifications this date.     PT Individual Minutes  Time In: 2885  Time Out: 0723  Minutes: 30  Timed Code Treatment Minutes: 30 Minutes  Procedure Minutes: 0     Timed Activity Minutes Units   Ther Ex 30 2     Signature:  Electronically signed by Greg Robles PT on 3/8/22 at 2:05 PM EST

## 2022-03-11 ENCOUNTER — HOSPITAL ENCOUNTER (OUTPATIENT)
Dept: PHYSICAL THERAPY | Age: 52
Setting detail: THERAPIES SERIES
Discharge: HOME | End: 2022-03-11
Payer: COMMERCIAL

## 2022-03-11 NOTE — PROGRESS NOTES
Therapy                            Cancellation/No-show Note      Date:  3/11/2022  Patient Name:  Dutch Peres  :  1970   MRN:  [id number]  Referring Practitioner: Tanna Llanes  Diagnosis: Patellofemoral pain syndrome of the R knee    Visit Information:  PT Visit Information  PT Insurance Information: BCBS  Total # of Visits Approved: 5  Total # of Visits to Date: 2  No Show: 1  Canceled Appointment: 2  Progress Note Counter:  (through 22)- cx 3/11/22    For today's appointment patient:  [x]  Cancelled  []  Rescheduled appointment  []  No-show   []  Called pt to remind of next appointment     Reason given by patient:  []  Patient ill  []  Conflicting appointment  []  No transportation    []  Conflict with work  []  No reason given  [x]  Other:  Pt requests D/C states she is doing well     [] Pt has future appointments scheduled, no follow up needed  [] Pt requests to be on hold.     Reason:   If > 2 weeks please discuss with therapist.  [] Therapist to call pt for follow up     Comments:       Signature: Electronically signed by Ezequiel Mejia PT on 3/11/22 at 1:02 PM EST

## 2022-03-11 NOTE — PROGRESS NOTES
[address]  PHYSICAL THERAPY PLAN OF CARE   [address] [place], [address] [facility]         Ph: [phone]  Fax: [phone]    [] Certification  [] Recertification []  Plan of Care  [] Progress Note [x] Discharge      To:   Blake Tennille      From:  Rusty Jason, PT, DPT   Patient: Blanca Sommers     : 1970  Diagnosis: Patellofemoral pain syndrome of the R knee     Date: 3/11/2022  Treatment Diagnosis: R knee pain, decreased B tibial IR, R Hip ER, R HS flexibility, B gastroc flexibility, R soleus flexibility, decreased R LE strength, decreased R SLS stability, decreased functional squat, and impaired functional activity tolerance     Progress Report Period from:  2022  to 3/08/2022    Total # of Visits to Date: 2   No Show: 1    Canceled Appointment: 2     OBJECTIVE:   Short Term Goals - Time Frame for Short term goals: 2-3 weeks    Goals Current/Discharge status  Met   Short term goal 1: The pt will report decreased R knee pain >/=75% in order to increase ease with sleeping and sitting prolonged periods  Denies recent pain [x] yes  [] no     Long Term Goals - Time Frame for Long term goals : 4-5 weeks  Goals Current/ Discharge status Met   Long term goal 1: The pt will have a increase in LEFS score >/=75/80 points in order to increase functional activity tolerance NT secondary to unexpected D/C  [] yes  [] no   Long term goal 2: The pt will demo improved R LE strength 4+ to 5/5 and R SL HR >15 reps without instability in order to ambulate prolonged periods without repports of R knee pain NT secondary to unexpected D/C  [] yes  [] no   Long term goal 3: The pt will demo improved R hip ER >/=40*, B tibial IR >/=20*, R 90/90 HS </=20*, B ankle DF with KE >/=15* in order to increase LE biomechanics NT secondary to unexpected D/C  [] yes  [] no   Long term goal 4: The pt will be indep/compliant with HEP in order to self-manage symptoms upon D/C Indep/compliant with HEP and progressions  [x] yes  [] no   Long term goal 5: The pt will demo improved R SLS >/=10 seconds to increase R LE stability (met 3/1/22) Met 3/1/22 [x] yes  [] no        Body structures, Functions, Activity limitations: Decreased functional mobility ,Decreased ROM,Decreased strength,Decreased posture,Increased pain,Decreased balance,Decreased high-level IADLs  Assessment: Pt failed to attend last scheduled appt for formal D/C measurements. Pt was progressing very well with decreased knee pain and increasing indep with HEP. Pt was provided updated comprehensive HEP at last visit and requests D/C at this time d/t feeling well. D/C further PT. Prognosis: Excellent    PLAN: D/C                  Patient Status:[] Continue/ Initiate plan of Care    [x] Discharge PT. Recommend pt continue with HEP. [] Additional visits requested, Please re-certify for additional visits:        Signature: Electronically signed by Sherryle Fountain, PT on 3/11/22 at 1:05 PM EST    If you have any questions or concerns, please don't hesitate to call. Thank you for your referral.    I have reviewed this plan of care and certify a need for medically necessary rehabilitation services.     Physician Signature:__________________________________________________________  Date:  Please sign and return

## 2023-04-26 ENCOUNTER — PROCEDURE VISIT (OUTPATIENT)
Dept: OBGYN CLINIC | Age: 53
End: 2023-04-26

## 2023-04-26 VITALS
BODY MASS INDEX: 27.79 KG/M2 | SYSTOLIC BLOOD PRESSURE: 136 MMHG | DIASTOLIC BLOOD PRESSURE: 86 MMHG | WEIGHT: 151 LBS | HEIGHT: 62 IN

## 2023-04-26 DIAGNOSIS — R87.810 ASCUS WITH POSITIVE HIGH RISK HPV CERVICAL: ICD-10-CM

## 2023-04-26 DIAGNOSIS — R87.610 ASCUS WITH POSITIVE HIGH RISK HPV CERVICAL: Primary | ICD-10-CM

## 2023-04-26 DIAGNOSIS — R87.810 ASCUS WITH POSITIVE HIGH RISK HPV CERVICAL: Primary | ICD-10-CM

## 2023-04-26 DIAGNOSIS — Z32.02 URINE PREGNANCY TEST NEGATIVE: ICD-10-CM

## 2023-04-26 DIAGNOSIS — R87.610 ASCUS WITH POSITIVE HIGH RISK HPV CERVICAL: ICD-10-CM

## 2023-04-26 LAB
HCG, URINE, POC: NEGATIVE
Lab: NORMAL
NEGATIVE QC PASS/FAIL: NORMAL
POSITIVE QC PASS/FAIL: NORMAL

## 2023-04-26 ASSESSMENT — ENCOUNTER SYMPTOMS
RECTAL PAIN: 0
CONSTIPATION: 0
ANAL BLEEDING: 0
BLOOD IN STOOL: 0
DIARRHEA: 0
RESPIRATORY NEGATIVE: 1
ABDOMINAL PAIN: 0
VOMITING: 0
NAUSEA: 0
EYES NEGATIVE: 1
ALLERGIC/IMMUNOLOGIC NEGATIVE: 1
ABDOMINAL DISTENTION: 0

## 2023-04-26 NOTE — PROGRESS NOTES
Pt here for colposcopy today for ascus/+hpv 16 and hpv 18. Reviewed medical, surgical, social and family history. Also reviewed current medications and allergies. Discussed procedure as well as abnormal paps and HPV in detail. Urine HCG negative and consents signed. Satisfactory colposcopy in routine fashion with cervical bx and ECC. Good hemostasis. D/C instructions discussed and F/U 2 weeks. Vitals:  /86   Ht 5' 2\" (1.575 m)   Wt 151 lb (68.5 kg)   BMI 27.62 kg/m²   Past Medical History:   Diagnosis Date    Goiter     thyroid goiter    Hypertension     LGSIL of cervix of undetermined significance     Pap smear of cervix shows high risk HPV present     HPV 16     Past Surgical History:   Procedure Laterality Date    CYST REMOVAL      tailbone and wrist    DILATION AND CURETTAGE OF UTERUS N/A 5/23/2017     HYSTEROSCOPY D&C  ABLATION performed by Geovanny Velasquez MD at [address]       Allergies:  Codeine  Current Outpatient Medications   Medication Sig Dispense Refill    aspirin 81 MG EC tablet Take 1 tablet by mouth daily      amLODIPine (NORVASC) 5 MG tablet Take 1 tablet by mouth daily      hydroCHLOROthiazide (HYDRODIURIL) 25 MG tablet Take 1 tablet by mouth daily       No current facility-administered medications for this visit.      Social History     Socioeconomic History    Marital status:      Spouse name: Not on file    Number of children: Not on file    Years of education: Not on file    Highest education level: Not on file   Occupational History    Not on file   Tobacco Use    Smoking status: Every Day    Smokeless tobacco: Never   Substance and Sexual Activity    Alcohol use: Yes    Drug use: No    Sexual activity: Not Currently     Birth control/protection: Pill   Other Topics Concern    Not on file   Social History Narrative    Not on file     Social Determinants of Health     Financial Resource Strain: Low Risk     Difficulty of Paying Living Expenses: Not

## 2023-05-03 ENCOUNTER — TELEPHONE (OUTPATIENT)
Dept: OBGYN CLINIC | Age: 53
End: 2023-05-03

## 2024-05-10 ENCOUNTER — APPOINTMENT (OUTPATIENT)
Dept: CT IMAGING | Age: 54
End: 2024-05-10
Payer: COMMERCIAL

## 2024-05-10 ENCOUNTER — HOSPITAL ENCOUNTER (EMERGENCY)
Age: 54
Discharge: HOME | End: 2024-05-10
Payer: COMMERCIAL

## 2024-05-10 VITALS
RESPIRATION RATE: 17 BRPM | TEMPERATURE: 97.6 F | WEIGHT: 143 LBS | OXYGEN SATURATION: 100 % | HEART RATE: 62 BPM | SYSTOLIC BLOOD PRESSURE: 126 MMHG | HEIGHT: 62 IN | DIASTOLIC BLOOD PRESSURE: 92 MMHG | BODY MASS INDEX: 26.31 KG/M2

## 2024-05-10 DIAGNOSIS — H53.122 VISUAL LOSS, TRANSIENT, LEFT: Primary | ICD-10-CM

## 2024-05-10 LAB
ALBUMIN SERPL-MCNC: 4.4 G/DL (ref 3.5–4.6)
ALP SERPL-CCNC: 62 U/L (ref 40–130)
ALT SERPL-CCNC: 6 U/L (ref 0–33)
ANION GAP SERPL CALCULATED.3IONS-SCNC: 13 MEQ/L (ref 9–15)
AST SERPL-CCNC: 12 U/L (ref 0–35)
BASOPHILS # BLD: 0.1 K/UL (ref 0–0.2)
BASOPHILS NFR BLD: 1 %
BILIRUB SERPL-MCNC: 0.3 MG/DL (ref 0.2–0.7)
BUN SERPL-MCNC: 10 MG/DL (ref 6–20)
CALCIUM SERPL-MCNC: 9.4 MG/DL (ref 8.5–9.9)
CHLORIDE SERPL-SCNC: 97 MEQ/L (ref 95–107)
CO2 SERPL-SCNC: 28 MEQ/L (ref 20–31)
CREAT SERPL-MCNC: 0.78 MG/DL (ref 0.5–0.9)
CRP SERPL HS-MCNC: <3 MG/L (ref 0–5)
EKG ATRIAL RATE: 60 BPM
EKG P AXIS: 37 DEGREES
EKG P-R INTERVAL: 182 MS
EKG Q-T INTERVAL: 470 MS
EKG QRS DURATION: 82 MS
EKG QTC CALCULATION (BAZETT): 470 MS
EKG R AXIS: 32 DEGREES
EKG T AXIS: 36 DEGREES
EKG VENTRICULAR RATE: 60 BPM
EOSINOPHIL # BLD: 0.2 K/UL (ref 0–0.7)
EOSINOPHIL NFR BLD: 2.8 %
ERYTHROCYTE [DISTWIDTH] IN BLOOD BY AUTOMATED COUNT: 13.2 % (ref 11.5–14.5)
ERYTHROCYTE [SEDIMENTATION RATE] IN BLOOD BY WESTERGREN METHOD: 16 MM (ref 0–30)
GLOBULIN SER CALC-MCNC: 3.3 G/DL (ref 2.3–3.5)
GLUCOSE SERPL-MCNC: 97 MG/DL (ref 70–99)
HCT VFR BLD AUTO: 40.8 % (ref 37–47)
HGB BLD-MCNC: 14 G/DL (ref 12–16)
LYMPHOCYTES # BLD: 2.3 K/UL (ref 1–4.8)
LYMPHOCYTES NFR BLD: 33 %
MAGNESIUM SERPL-MCNC: 2.2 MG/DL (ref 1.7–2.4)
MCH RBC QN AUTO: 30.5 PG (ref 27–31.3)
MCHC RBC AUTO-ENTMCNC: 34.3 % (ref 33–37)
MCV RBC AUTO: 88.9 FL (ref 79.4–94.8)
MONOCYTES # BLD: 0.7 K/UL (ref 0.2–0.8)
MONOCYTES NFR BLD: 10.2 %
NEUTROPHILS # BLD: 3.6 K/UL (ref 1.4–6.5)
NEUTS SEG NFR BLD: 52.7 %
PLATELET # BLD AUTO: 358 K/UL (ref 130–400)
POTASSIUM SERPL-SCNC: 3.3 MEQ/L (ref 3.4–4.9)
PROT SERPL-MCNC: 7.7 G/DL (ref 6.3–8)
RBC # BLD AUTO: 4.59 M/UL (ref 4.2–5.4)
SODIUM SERPL-SCNC: 138 MEQ/L (ref 135–144)
TROPONIN, HIGH SENSITIVITY: <6 NG/L (ref 0–19)
TROPONIN, HIGH SENSITIVITY: <6 NG/L (ref 0–19)
WBC # BLD AUTO: 6.8 K/UL (ref 4.8–10.8)

## 2024-05-10 PROCEDURE — 86140 C-REACTIVE PROTEIN: CPT

## 2024-05-10 PROCEDURE — 85652 RBC SED RATE AUTOMATED: CPT

## 2024-05-10 PROCEDURE — 85025 COMPLETE CBC W/AUTO DIFF WBC: CPT

## 2024-05-10 PROCEDURE — 6370000000 HC RX 637 (ALT 250 FOR IP)

## 2024-05-10 PROCEDURE — 80053 COMPREHEN METABOLIC PANEL: CPT

## 2024-05-10 PROCEDURE — 99284 EMERGENCY DEPT VISIT MOD MDM: CPT

## 2024-05-10 PROCEDURE — 84484 ASSAY OF TROPONIN QUANT: CPT

## 2024-05-10 PROCEDURE — 83735 ASSAY OF MAGNESIUM: CPT

## 2024-05-10 PROCEDURE — 70450 CT HEAD/BRAIN W/O DYE: CPT

## 2024-05-10 RX ORDER — TETRACAINE HYDROCHLORIDE 5 MG/ML
1 SOLUTION OPHTHALMIC ONCE
Status: COMPLETED | OUTPATIENT
Start: 2024-05-10 | End: 2024-05-10

## 2024-05-10 RX ADMIN — POTASSIUM BICARBONATE 25 MEQ: 978 TABLET, EFFERVESCENT ORAL at 15:26

## 2024-05-10 RX ADMIN — TETRACAINE HYDROCHLORIDE 1 DROP: 5 SOLUTION OPHTHALMIC at 14:20

## 2024-05-10 RX ADMIN — FLUORESCEIN SODIUM 1 MG: 1 STRIP OPHTHALMIC at 14:21

## 2024-05-10 ASSESSMENT — LIFESTYLE VARIABLES
HOW OFTEN DO YOU HAVE A DRINK CONTAINING ALCOHOL: NEVER
HOW MANY STANDARD DRINKS CONTAINING ALCOHOL DO YOU HAVE ON A TYPICAL DAY: PATIENT DOES NOT DRINK

## 2024-05-10 ASSESSMENT — PAIN - FUNCTIONAL ASSESSMENT
PAIN_FUNCTIONAL_ASSESSMENT: NONE - DENIES PAIN
PAIN_FUNCTIONAL_ASSESSMENT: NONE - DENIES PAIN

## 2024-05-10 ASSESSMENT — TONOMETRY
OS_IOP_MMHG: 24
IOP_AUTOMATED: 1
OD_IOP_MMHG: 24

## 2024-05-10 ASSESSMENT — VISUAL ACUITY: OU: 1

## 2024-05-10 NOTE — ED PROVIDER NOTES
[facility] ED  EMERGENCY DEPARTMENT ENCOUNTER      Pt Name: Louisa Gómez  MRN: [id number]  Birthdate 1970  Date of evaluation: 5/10/2024  Provider: ZAHIDA Diaz  12:46 PM EDT    CHIEF COMPLAINT       Chief Complaint   Patient presents with    Loss of Vision     NOS left eye blurred Periferal, feeling off X30 min          HISTORY OF PRESENT ILLNESS   (Location/Symptom, Timing/Onset, Context/Setting, Quality, Duration, Modifying Factors, Severity)  Note limiting factors.   Louisa Gómez is a 53 y.o. female whom per chart review has a PMHx of hypertension, CVA presents to ED for evaluation of L visual disturbance, headache.  Patient reports that she was at work, utilizing the computer and patient reports that she noticed that her peripheral vision to her L eye was blurred.  Patient also reports that she noted a headache at that time.  Patient verbalizes no additional complaints.    HPI    Nursing Notes were reviewed.    REVIEW OF SYSTEMS    (2-9 systems for level 4, 10 or more for level 5)     Review of Systems   Eyes:  Positive for visual disturbance.   Neurological:  Positive for headaches.   All other systems reviewed and are negative.      Except as noted above the remainder of the review of systems was reviewed and negative.       PAST MEDICAL HISTORY     Past Medical History:   Diagnosis Date    Goiter     thyroid goiter    Hypertension     LGSIL of cervix of undetermined significance     Pap smear of cervix shows high risk HPV present     HPV 16         SURGICAL HISTORY       Past Surgical History:   Procedure Laterality Date    CYST REMOVAL      tailbone and wrist    DILATION AND CURETTAGE OF UTERUS N/A 5/23/2017     HYSTEROSCOPY D&C  ABLATION performed by Judi Cabrera MD at [facility]    TUBAL LIGATION           CURRENT MEDICATIONS       Discharge Medication List as of 5/10/2024  3:41 PM        CONTINUE these medications which have NOT CHANGED    Details   aspirin 81 MG EC tablet 
stretcher

## 2024-05-10 NOTE — DISCHARGE INSTRUCTIONS
Continue taking daily ASA.    Follow-up with ophthalmology, neurology.     Return to ED if any new, or worsening symptoms.

## 2024-05-14 LAB
EKG ATRIAL RATE: 60 BPM
EKG P AXIS: 37 DEGREES
EKG P-R INTERVAL: 182 MS
EKG Q-T INTERVAL: 470 MS
EKG QRS DURATION: 82 MS
EKG QTC CALCULATION (BAZETT): 470 MS
EKG R AXIS: 32 DEGREES
EKG T AXIS: 36 DEGREES
EKG VENTRICULAR RATE: 60 BPM

## 2024-12-20 ENCOUNTER — OFFICE VISIT (OUTPATIENT)
Dept: URGENT CARE | Age: 54
End: 2024-12-20

## 2024-12-20 VITALS
DIASTOLIC BLOOD PRESSURE: 95 MMHG | SYSTOLIC BLOOD PRESSURE: 133 MMHG | TEMPERATURE: 99.7 F | OXYGEN SATURATION: 99 % | WEIGHT: 135 LBS | RESPIRATION RATE: 18 BRPM | HEART RATE: 86 BPM | HEIGHT: 62 IN | BODY MASS INDEX: 24.84 KG/M2

## 2024-12-20 DIAGNOSIS — J04.0 LARYNGITIS, ACUTE: ICD-10-CM

## 2024-12-20 DIAGNOSIS — J06.9 UPPER RESPIRATORY TRACT INFECTION, UNSPECIFIED TYPE: Primary | ICD-10-CM

## 2024-12-20 LAB
POC RAPID INFLUENZA A: NEGATIVE
POC RAPID INFLUENZA B: NEGATIVE
POC SARS-COV-2 AG BINAX: NORMAL

## 2024-12-20 RX ORDER — PREDNISONE 10 MG/1
TABLET ORAL
Qty: 30 TABLET | Refills: 0 | Status: SHIPPED | OUTPATIENT
Start: 2024-12-20

## 2024-12-20 RX ORDER — BROMPHENIRAMINE MALEATE, PSEUDOEPHEDRINE HYDROCHLORIDE, AND DEXTROMETHORPHAN HYDROBROMIDE 2; 30; 10 MG/5ML; MG/5ML; MG/5ML
5 SYRUP ORAL EVERY 4 HOURS PRN
Qty: 120 ML | Refills: 0 | Status: SHIPPED | OUTPATIENT
Start: 2024-12-20

## 2024-12-20 RX ORDER — AZITHROMYCIN 250 MG/1
TABLET, FILM COATED ORAL
Qty: 6 TABLET | Refills: 0 | Status: SHIPPED | OUTPATIENT
Start: 2024-12-20

## 2024-12-20 RX ORDER — HYDROCHLOROTHIAZIDE 25 MG/1
TABLET ORAL
COMMUNITY
Start: 2024-01-08

## 2024-12-20 RX ORDER — ASPIRIN 81 MG/1
TABLET ORAL
COMMUNITY
Start: 2024-01-08

## 2024-12-20 ASSESSMENT — PATIENT HEALTH QUESTIONNAIRE - PHQ9
2. FEELING DOWN, DEPRESSED OR HOPELESS: NOT AT ALL
SUM OF ALL RESPONSES TO PHQ9 QUESTIONS 1 & 2: 0
1. LITTLE INTEREST OR PLEASURE IN DOING THINGS: NOT AT ALL

## 2024-12-20 NOTE — PROGRESS NOTES
"Subjective   Patient ID: Lidia Martinez is a 54 y.o. female who presents for Cough (Started last night /Raspy voice ).  HPI  Presents for evaluation of URI.  Symptoms including cough, congestion, loss of voice have been present for 1 day and refractory to OTC meds.  Patient is a smoker and reports yearly bouts of bronchitis about this time a year.  No fever, chills, nausea, vomiting, abdominal pain, CP, or SOB.  No exacerbating factors    Review of Systems    Constitutional:  See HPI   ENT: See HPI  Respiratory: See HPI  Neurologic:  Alert and oriented X4, No numbness, No tingling.    All other systems are negative     Objective     BP (!) 133/95 (BP Location: Left arm, Patient Position: Sitting)   Pulse 86   Temp 37.6 °C (99.7 °F)   Resp 18   Ht 1.575 m (5' 2\")   Wt 61.2 kg (135 lb)   SpO2 99%   BMI 24.69 kg/m²     Physical Exam    General:  Alert and oriented, No acute distress.    Eye:  Pupils are equal, round and reactive to light, Normal conjunctiva.    HENT:  Normocephalic, loss of voice noted; no nasal congestion or sinus tenderness; unremarkable oropharynx; no cervical adenopathy  Neck:  Supple    Respiratory: Respirations are non-labored; LCTA bilaterally  Musculoskeletal: Normal ROM and strength  Integumentary:  Warm, Dry, Intact, No pallor, No rash.    Neurologic:  Alert, Oriented, Normal sensory, Cranial Nerves II-XII are grossly intact  Psychiatric:  Cooperative, Appropriate mood & affect.    Assessment/Plan   Exam is consistent with upper respiratory infection and laryngitis.  Prescription for Z-Keshawn, prednisone taper, and Bromfed.  Patient's clinical presentation is otherwise unremarkable at this time. Patient is discharged with instructions to follow-up with primary care or seek emergency medical attention for worsening symptoms or any new concerns.  Problem List Items Addressed This Visit    None  Visit Diagnoses       Upper respiratory tract infection, unspecified type    -  Primary    " Relevant Medications    azithromycin (Zithromax) 250 mg tablet    brompheniramine-pseudoeph-DM (Bromfed DM) 2-30-10 mg/5 mL syrup    predniSONE (Deltasone) 10 mg tablet    Other Relevant Orders    POCT Covid-19 Rapid Antigen (Completed)    POCT Influenza A/B manually resulted (Completed)    Laryngitis, acute        Relevant Medications    azithromycin (Zithromax) 250 mg tablet    brompheniramine-pseudoeph-DM (Bromfed DM) 2-30-10 mg/5 mL syrup    predniSONE (Deltasone) 10 mg tablet            Final diagnoses:   [J06.9] Upper respiratory tract infection, unspecified type   [J04.0] Laryngitis, acute

## 2025-01-15 ENCOUNTER — TRANSCRIBE ORDERS (OUTPATIENT)
Dept: ADMINISTRATIVE | Age: 55
End: 2025-01-15

## 2025-01-15 DIAGNOSIS — Z12.31 SCREENING MAMMOGRAM FOR BREAST CANCER: Primary | ICD-10-CM

## 2025-01-22 ENCOUNTER — APPOINTMENT (OUTPATIENT)
Dept: CARDIOLOGY | Facility: CLINIC | Age: 55
End: 2025-01-22
Payer: COMMERCIAL

## 2025-01-24 ENCOUNTER — HOSPITAL ENCOUNTER (OUTPATIENT)
Dept: WOMENS IMAGING | Age: 55
Discharge: HOME | End: 2025-01-26
Payer: COMMERCIAL

## 2025-01-24 DIAGNOSIS — Z12.31 SCREENING MAMMOGRAM FOR BREAST CANCER: ICD-10-CM

## 2025-01-24 PROCEDURE — 77063 BREAST TOMOSYNTHESIS BI: CPT

## 2025-05-28 ENCOUNTER — HOSPITAL ENCOUNTER (OUTPATIENT)
Dept: GENERAL RADIOLOGY | Age: 55
Discharge: HOME | End: 2025-05-30
Payer: COMMERCIAL

## 2025-05-28 ENCOUNTER — TRANSCRIBE ORDERS (OUTPATIENT)
Dept: GENERAL RADIOLOGY | Age: 55
End: 2025-05-28

## 2025-05-28 DIAGNOSIS — J40 BRONCHITIS, NOT SPECIFIED AS ACUTE OR CHRONIC: ICD-10-CM

## 2025-05-28 DIAGNOSIS — J40 BRONCHITIS, NOT SPECIFIED AS ACUTE OR CHRONIC: Primary | ICD-10-CM

## 2025-05-28 PROCEDURE — 71046 X-RAY EXAM CHEST 2 VIEWS: CPT

## 2025-06-05 ENCOUNTER — APPOINTMENT (OUTPATIENT)
Dept: ORTHOPEDIC SURGERY | Facility: CLINIC | Age: 55
End: 2025-06-05
Payer: COMMERCIAL

## 2025-06-05 DIAGNOSIS — M65.30 TRIGGER FINGER, ACQUIRED: Primary | ICD-10-CM

## 2025-06-05 PROCEDURE — 99204 OFFICE O/P NEW MOD 45 MIN: CPT | Performed by: STUDENT IN AN ORGANIZED HEALTH CARE EDUCATION/TRAINING PROGRAM

## 2025-06-05 PROCEDURE — 20550 NJX 1 TENDON SHEATH/LIGAMENT: CPT | Performed by: STUDENT IN AN ORGANIZED HEALTH CARE EDUCATION/TRAINING PROGRAM

## 2025-06-05 RX ORDER — LIDOCAINE HYDROCHLORIDE 10 MG/ML
0.5 INJECTION, SOLUTION INFILTRATION; PERINEURAL
Status: COMPLETED | OUTPATIENT
Start: 2025-06-05 | End: 2025-06-05

## 2025-06-05 RX ADMIN — LIDOCAINE HYDROCHLORIDE 0.5 ML: 10 INJECTION, SOLUTION INFILTRATION; PERINEURAL at 08:19

## 2025-06-05 NOTE — PROGRESS NOTES
History of Present Illness:  Presents for evaluation of right middle trigger finger.  The symptoms have been present for weeks. The patient denies any inciting trauma. The pain is localized to the A1 pulley of the affected finger. It is described as moderate. The pain/locking occurs intermittantly and is more severe overnight and in the morning.     Patient first noticed this while playing pickleball.    Review of Systems   GENERAL: Negative for malaise, significant weight loss, fever  MUSCULOSKELETAL: see HPI  NEURO:  Negative    The patient's past medical history, family history, social history, and review of systems were reviewed. History is otherwise negative except as stated in the HPI.    Physical Examination:  General: Alert and oriented to person, place, and time.  No acute distress and breathing comfortably: Pleasant and cooperative with examination.  HEENT: Head is normocephalic and atraumatic.  Neck: Supple, no visible swelling.  Cardiovascular: No palpable tachycardia  Lungs: No audible wheezing or labored breathing  Abdomen: Nondistended.  On musculoskeletal examination, the elbow and wrist have full, symmetric range of motion without obvious tenderness to palpation. Strength is full. Sensation and motor function are intact in the radial, ulnar, and median nerve distribution. There is no thenar or intrinsic atrophy with appropriate strength. There is obvious triggering of the right middle finger with tenderness over the corresponding A1 pulley. The adjacent fingers are unaffected. There is intact flexor and extensor tendon function. The patient can make a full composite fist but has pain while doing so. The hand itself is warm and well perfused. The skin is intact throughout. The contralateral hand and wrist are normal to inspection, range of motion, stability, and strength.    Hand / UE Inj/Asp: R long A1 for trigger finger on 6/5/2025 8:19 AM  Indications: pain  Details: 24 G needle, volar  approach  Medications: 5 mg triamcinolone acetonide 10 mg/mL; 0.5 mL lidocaine 10 mg/mL (1 %)  Procedure, treatment alternatives, risks and benefits explained, specific risks discussed. Consent was given by the patient. Immediately prior to procedure a time out was called to verify the correct patient, procedure, equipment, support staff and site/side marked as required.             Assessment:  Patient with a right middle trigger finger. Discussed at length with patient today, she would like to proceed with cortisone injection.    Plan:  Injection. I had a long discussion with the patient regarding the diagnosis of trigger finger and the risks/benefits/expected outcomes of various treatment options.  At this point, the patient elected non-operative treatment consisting of a corticosteroid injection. I reviewed the specific risks of injection, which include, but are not limited to, infection, bleeding, pain, steroid flare, glycemic alteration, subcutaneous fat atrophy, skin hypopigmentation, soft tissue damage, and incomplete symptom relief. The patient consented to the injection. Then, using sterile technique, I injected 1mL of Kenalog 40 into the area of the flexor sheath at the level of the A1 pulley. The injection site was dressed, and the patient tolerated the injection well. Finally, I emphasized patience, as any benefit may take some time to manifest. Depending on the success of the injection, I will see them back on an as needed basis.      Follow up: As needed      Joselin Oconnor MD  Orthopaedic Surgeon

## 2025-06-19 ENCOUNTER — APPOINTMENT (OUTPATIENT)
Dept: ORTHOPEDIC SURGERY | Facility: CLINIC | Age: 55
End: 2025-06-19
Payer: COMMERCIAL

## 2025-06-19 DIAGNOSIS — M65.30 TRIGGER FINGER, ACQUIRED: Primary | ICD-10-CM

## (undated) DEVICE — SET ENDOSCP SEAL HYSTEROSCOPE RIG OUTFLO CHN DISP MYOSURE

## (undated) DEVICE — PAD,NON-ADHERENT,3X8,STERILE,LF,1/PK: Brand: MEDLINE

## (undated) DEVICE — DISCONTINUED USE 396829 PAD SANITARY INDIVIDUALLY WRAP 4 MAXI

## (undated) DEVICE — GLOVE SURG L12IN SZ 65FNGR THK94MIL TRNSLUC YEL LTX

## (undated) DEVICE — TRAY PREP DRY W/ PREM GLV 2 APPL 6 SPNG 2 UNDPD 1 OVERWRAP

## (undated) DEVICE — SLEEVE CMPR SM STD CALF SCD ANEMB LF

## (undated) DEVICE — PACK,SET UP,DRAPE: Brand: MEDLINE

## (undated) DEVICE — LITHOTOMY DRAPE WITH FLUID CONTROL POUCH: Brand: CONVERTORS

## (undated) DEVICE — 2000CC GUARDIAN II: Brand: GUARDIAN

## (undated) DEVICE — X-RAY DETECTABLE SPONGES,16 PLY: Brand: VISTEC

## (undated) DEVICE — KIT CANSTR VAC TANTEM TB FOR AQUILEX FLD CTRL SYS

## (undated) DEVICE — SET FLD CTRL SYS INFLO AND OUTFLO TB AQUILEX

## (undated) DEVICE — GOWN,AURORA,NONREINFORCED,LARGE: Brand: MEDLINE

## (undated) DEVICE — Z DISCONTINUED NO SUB IDED KIT ENDOMET ABLAT IMPED CTRL DEV W/ RF CTRL FTSWCH SUCT LN